# Patient Record
Sex: MALE | Race: WHITE | URBAN - METROPOLITAN AREA
[De-identification: names, ages, dates, MRNs, and addresses within clinical notes are randomized per-mention and may not be internally consistent; named-entity substitution may affect disease eponyms.]

---

## 2019-05-24 ENCOUNTER — HOSPITAL ENCOUNTER (OUTPATIENT)
Dept: URGENT CARE | Facility: CLINIC | Age: 39
Discharge: HOME | End: 2019-05-24

## 2019-05-29 ENCOUNTER — HOSPITAL ENCOUNTER (OUTPATIENT)
Dept: ULTRASOUND IMAGING | Facility: HOSPITAL | Age: 39
Discharge: HOME | End: 2019-05-29

## 2021-02-04 ENCOUNTER — OFFICE VISIT CONVERTED (OUTPATIENT)
Dept: PODIATRY | Facility: CLINIC | Age: 41
End: 2021-02-04
Attending: PODIATRIST

## 2021-02-04 ENCOUNTER — HOSPITAL ENCOUNTER (OUTPATIENT)
Dept: OTHER | Facility: HOSPITAL | Age: 41
Discharge: HOME | End: 2021-02-04
Attending: PODIATRIST

## 2021-05-10 NOTE — H&P
History and Physical      Patient Name: Erlin Rosa   Patient ID: [id number]   Sex: Male   YOB: 1980    Primary Care Provider: Kvng Gaston MD    Visit Date: February 4, 2021    Provider: Dustin Aldana DPM   Location: [facility]   Location Address: [address] [place], [place] [address]an [facility]  [address] [place] [address]  [id number]   Location Phone: [phone]          Chief Complaint  · Left Foot Pain      History Of Present Illness  Erlin Rosa is a 40 year old male who presents to the [facility] today new patient      New, Established, New Problem: New  Location: Posterior left heel  Duration: 2 weeks  Onset: Acute, when he stepped on one of his children's toys in this area  Nature: Sore, achy, intermittent  Stable, worsening, improving: Stable, no improvement  Aggravating factors:  Patient relates pain is aggravated by shoe gear and ambulation.    Previous Treatment: None    Patient denies any fevers, chills, nausea, vomiting, shortness of breathe, nor any other constitutional signs nor symptoms.    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       Past Medical History  Foot injury; Heel pain         Past Surgical History  Cholecystectomy         Medication List  lisinopril-hydrochlorothiazide 10-12.5 mg oral tablet         Allergy List  NO KNOWN DRUG ALLERGIES       Allergies Reconciled  Family Medical History  FH: heart disease         Social History  Alcohol (Never); Tobacco (Current some day)         Review of Systems  · Constitutional  o Denies  o : fatigue, night sweats  · Eyes  o Denies  o : double vision, blurred vision  · HENT  o Denies  o : vertigo, recent head injury  · Cardiovascular  o Denies  o : chest pain, irregular heart beats  · Respiratory  o Denies  o : shortness of breath, productive cough  · Gastrointestinal  o Denies  o : nausea, vomiting  · Genitourinary  o Denies  o : dysuria, urinary retention  · Integument  o Denies  o : hair  "growth change, new skin lesions  · Neurologic  o Denies  o : altered mental status, seizures  · Musculoskeletal  o * See HPI  · Endocrine  o Denies  o : cold intolerance, heat intolerance  · Heme-Lymph  o Denies  o : petechiae, lymph node enlargement or tenderness  · Allergic-Immunologic  o Denies  o : frequent illnesses      Vitals  Date Time BP Position Site L\R Cuff Size HR RR TEMP (F) WT  HT  BMI kg/m2 BSA m2 O2 Sat FR L/min FiO2 HC       02/04/2021 10:26 /89 Sitting    70 - R  98 261lbs 0oz 5'  10\" 37.45 2.42 97 %            Physical Examination  · Constitutional  o Appearance  o : awake, alert, well developed, well nourished and well groomed  · Cardiovascular  o Peripheral Vascular System  o :   § Pedal Pulses  § : pedal pulses are 2+ and symmetrical  § Extremities  § : no edema of the lower extremities  · Musculoskeletal  o Extremeties/Joint  o : Lower extremity muscle strength and range of motion is equal and symmetrical bilaterally.   · Skin and Subcutaneous Tissue  o General Inspection  o : normal texture and turgor, with no excrenscense noted.   o Digits and Nails  o : toenails are noted to be without disease.  · Neurologic  o Sensation  o : epicritic sensations intact bilaterally  · Left Ankle/Foot  o Inspection  o : Foot Assessment Left: Mild +TTP to the area immediately anterior to the Left Achilles Tendon insertion. No signs of edema, erythema, lymphangitis, nor signs of infection.      Dr. Aldana reviewed radiographs and results from [facility] and discussed them with the patient.  These are significant for small posterior calcaneal enthesopathy.  No periosteal reactions nor osteolytic changes seen.  No occult fractures seen.           Assessment  · Achilles tendinitis of left lower extremity     726.71/M76.62  · Foot pain, left     729.5/M79.672      Plan  · Orders  o Foot (Left) 3 or more views X-Ray [facility] Preferred View ([id number]) - - 02/04/2021  · Medications  o diclofenac sodium " 75 mg oral tablet,delayed release (DR/EC)   SIG: take 1 tablet (75 mg) by oral route 2 times per day for 30 days   DISP: (60) Tablet with 1 refills  Prescribed on 02/04/2021     o Medrol (Peter) 4 mg oral tablets,dose pack   SIG: take as directed for 6 days   DISP: (1) Package with 0 refills  Prescribed on 02/04/2021     o Medications have been Reconciled  o Transition of Care or Provider Policy  · Instructions  o Discuss Findings: I have discussed the findings of this evaluation with the patient. The discussion included a complete verbal explanation of any changes in the examination results, diagnosis, and the current treatment plan. A schedule for future care needs was explained. If any questions should arise after returning home, I have encouraged the patient to feel free to contact Dr. Aldana. The patient states understanding and agreement with this plan.  o Monitor For Problems: Pt to monitor for problems and to contact Dr. Aldana for follow-up should such signs occur. Patient states understanding and agreement with this plan.  o No Impact: No impact activities at this time. Otherwise, she can ambulate in supportive shoegear. Pt states understanding and agreement with this plan.   o Follow Up in 2 weeks. Medication follow-up. If no improvement will consider cortisone injection with Unna boot wrap.  o Rice Therapy: It is important to treat any injury as soon as possible to help control swelling and increase recovery time. The recognized regimen for immediate treatment of sport injuries includes rest, ice (cold application), compression, and elevation (RICE). Remove the injured athlete from play, apply ice to the affected area, wrap or compress the injured area with an elastic bandage when appropriate, and elevate the injured area above heart level to reduce swelling. The patient is to not use ice for longer than 20 minutes at a time, with at least 20 minutes of no ice usage between applications. Discussed  proper shoegear for the patient's feet and medical condition. Patient states understanding and agreement with this plan.   o Electronically Identified Patient Education Materials Provided Electronically  · Disposition  o Call or Return if symptoms worsen or persist.            Electronically Signed by: Dustin Aldana DPM -Author on February 4, 2021 10:49:16 AM

## 2021-05-14 VITALS
SYSTOLIC BLOOD PRESSURE: 127 MMHG | HEART RATE: 70 BPM | TEMPERATURE: 98 F | DIASTOLIC BLOOD PRESSURE: 89 MMHG | OXYGEN SATURATION: 97 % | WEIGHT: 261 LBS | BODY MASS INDEX: 37.37 KG/M2 | HEIGHT: 70 IN